# Patient Record
Sex: FEMALE | Race: WHITE
[De-identification: names, ages, dates, MRNs, and addresses within clinical notes are randomized per-mention and may not be internally consistent; named-entity substitution may affect disease eponyms.]

---

## 2017-04-23 ENCOUNTER — HOSPITAL ENCOUNTER (EMERGENCY)
Dept: HOSPITAL 31 - C.ER | Age: 22
Discharge: HOME | End: 2017-04-23
Payer: MEDICAID

## 2017-04-23 VITALS — TEMPERATURE: 98.4 F

## 2017-04-23 VITALS — BODY MASS INDEX: 28.2 KG/M2

## 2017-04-23 VITALS — HEART RATE: 88 BPM | DIASTOLIC BLOOD PRESSURE: 89 MMHG | RESPIRATION RATE: 12 BRPM | SYSTOLIC BLOOD PRESSURE: 137 MMHG

## 2017-04-23 VITALS — OXYGEN SATURATION: 99 %

## 2017-04-23 DIAGNOSIS — R00.2: ICD-10-CM

## 2017-04-23 DIAGNOSIS — M94.0: Primary | ICD-10-CM

## 2017-04-23 LAB
ALBUMIN/GLOB SERPL: 1.3 {RATIO} (ref 1–2.1)
ALP SERPL-CCNC: 65 U/L (ref 38–126)
ALT SERPL-CCNC: 32 U/L (ref 9–52)
AST SERPL-CCNC: 29 U/L (ref 14–36)
BASOPHILS # BLD AUTO: 0 K/UL (ref 0–0.2)
BASOPHILS NFR BLD: 0.7 % (ref 0–2)
BILIRUB SERPL-MCNC: 0.6 MG/DL (ref 0.2–1.3)
BILIRUB UR-MCNC: NEGATIVE MG/DL
BUN SERPL-MCNC: 11 MG/DL (ref 7–17)
CALCIUM SERPL-MCNC: 8.7 MG/DL (ref 8.6–10.4)
CHLORIDE SERPL-SCNC: 102 MMOL/L (ref 98–107)
CO2 SERPL-SCNC: 24 MMOL/L (ref 22–30)
EOSINOPHIL # BLD AUTO: 0.1 K/UL (ref 0–0.7)
EOSINOPHIL NFR BLD: 2.4 % (ref 0–4)
ERYTHROCYTE [DISTWIDTH] IN BLOOD BY AUTOMATED COUNT: 13.9 % (ref 11.5–14.5)
GLOBULIN SER-MCNC: 3.2 GM/DL (ref 2.2–3.9)
GLUCOSE SERPL-MCNC: 91 MG/DL (ref 65–105)
GLUCOSE UR STRIP-MCNC: NORMAL MG/DL
HCT VFR BLD CALC: 41.1 % (ref 34–47)
KETONES UR STRIP-MCNC: NEGATIVE MG/DL
LEUKOCYTE ESTERASE UR-ACNC: (no result) LEU/UL
LYMPHOCYTES # BLD AUTO: 0.9 K/UL (ref 1–4.3)
LYMPHOCYTES NFR BLD AUTO: 16.8 % (ref 20–40)
MCH RBC QN AUTO: 26.9 PG (ref 27–31)
MCHC RBC AUTO-ENTMCNC: 32.5 G/DL (ref 33–37)
MCV RBC AUTO: 82.9 FL (ref 81–99)
MONOCYTES # BLD: 0.3 K/UL (ref 0–0.8)
MONOCYTES NFR BLD: 6.4 % (ref 0–10)
NRBC BLD AUTO-RTO: 0 % (ref 0–2)
PH UR STRIP: 6 [PH] (ref 5–8)
PLATELET # BLD: 295 K/UL (ref 130–400)
PMV BLD AUTO: 8.9 FL (ref 7.2–11.7)
POTASSIUM SERPL-SCNC: 3.8 MMOL/L (ref 3.6–5.2)
PROT SERPL-MCNC: 7.6 G/DL (ref 6.3–8.3)
PROT UR STRIP-MCNC: NEGATIVE MG/DL
RBC # UR STRIP: (no result) /UL
RBC #/AREA URNS HPF: 16 /HPF (ref 0–3)
SODIUM SERPL-SCNC: 142 MMOL/L (ref 132–148)
SP GR UR STRIP: 1.02 (ref 1–1.03)
TSH SERPL-ACNC: 3.33 MIU/L (ref 0.46–4.68)
UROBILINOGEN UR-MCNC: NORMAL MG/DL (ref 0.2–1)
WBC # BLD AUTO: 5.4 K/UL (ref 4.8–10.8)
WBC #/AREA URNS HPF: 1 /HPF (ref 0–5)

## 2017-04-23 PROCEDURE — 81001 URINALYSIS AUTO W/SCOPE: CPT

## 2017-04-23 PROCEDURE — 96374 THER/PROPH/DIAG INJ IV PUSH: CPT

## 2017-04-23 PROCEDURE — 84443 ASSAY THYROID STIM HORMONE: CPT

## 2017-04-23 PROCEDURE — 96375 TX/PRO/DX INJ NEW DRUG ADDON: CPT

## 2017-04-23 PROCEDURE — 80053 COMPREHEN METABOLIC PANEL: CPT

## 2017-04-23 PROCEDURE — 85378 FIBRIN DEGRADE SEMIQUANT: CPT

## 2017-04-23 PROCEDURE — 93005 ELECTROCARDIOGRAM TRACING: CPT

## 2017-04-23 PROCEDURE — 83690 ASSAY OF LIPASE: CPT

## 2017-04-23 PROCEDURE — 85025 COMPLETE CBC W/AUTO DIFF WBC: CPT

## 2017-04-23 PROCEDURE — 99284 EMERGENCY DEPT VISIT MOD MDM: CPT

## 2017-04-23 PROCEDURE — 71010: CPT

## 2017-04-23 PROCEDURE — 84703 CHORIONIC GONADOTROPIN ASSAY: CPT

## 2017-04-23 NOTE — CARD
--------------- APPROVED REPORT --------------





EKG Measurement

Heart [id number]

NH [id number]

[id number]

[id number]

[id number]



<Conclusion>

Sinus tachycardia

Possible Left atrial enlargement

Borderline ECG

## 2017-04-23 NOTE — RAD
PROCEDURE:  CHEST RADIOGRAPH, 1 VIEW



HISTORY:

epigastric abd pain, chest pain



COMPARISON:

3/13/17.



FINDINGS:



LUNGS:

Clear.



PLEURA:

No pneumothorax or pleural fluid seen.



CARDIOVASCULAR:

Normal.



OSSEOUS STRUCTURES:

No significant abnormalities.



VISUALIZED UPPER ABDOMEN:

Normal.



OTHER FINDINGS:

None. 



IMPRESSION:

No active disease.

## 2017-04-23 NOTE — C.PDOC
History Of Present Illness


Patient is a 20 y/o female, whose PMHx includes hypothyroidism, presents to the 

ED for evaluation of intermittent chest pain associated with palpitations for 

the past 2-3 months. Pt notes being evaluated by cardiologist with negative 

echocardiogram and monitoring. Patient reports increased mid sternal chest pain 

and palpitation for the past 2-3 day which prompted her to visit ED today. Pt 

states her pain radiates to left axilla and is worse with movement and deep 

breathing. Pt also reports throat pain and body aches. Of note, patient has 

been seen multiple times in the past for similar symptoms. Otherwise, denies 

any fever, cough, nausea, vomiting, diaphoresis, or any other associated 

symptoms at this time. 





Chief Complaint (Nursing): Chest Pain


History Per: Patient


History/Exam Limitations: no limitations


Onset/Duration Of Symptoms: Days (2-3 months), Worse Since (2-3 days)


Current Symptoms Are (Timing): Still Present


Quality: "Pain"


Associated Symptoms: denies: Nausea, Dyspnea, Diaphoresis, Syncope


Modifying Factors: None


Exacerbating Factors: Movement, Deep Breathing


Alleviating Factors: None


Recent travel outside of the [place]: No


Additional History Per: Patient





Past Medical History


Reviewed: Historical Data, Nursing Documentation, Vital Signs


Vital Signs: 


 Last Vital Signs











Temp  98.4 F   17 09:24


 


Pulse  88   17 11:29


 


Resp  12   17 11:29


 


BP  137/89   17 11:29


 


Pulse Ox  99   17 11:31














- Medical History


PMH: Anxiety (10 year history of anxiety), Asthma, Gastritis, Hypothyroidism


   Denies: HTN, Chronic Kidney Disease


Family History: States: No Known Family Hx





- Social History


Hx Tobacco Use: No


Hx Alcohol Use: No


Hx Substance Use: No





- Immunization History


Hx Tetanus Toxoid Vaccination: No


Hx Influenza Vaccination: No


Hx Pneumococcal Vaccination: No





Review Of Systems


Except As Marked, All Systems Reviewed And Found Negative.


Constitutional: Positive for: Other (body aches).  Negative for: Fever, Chills, 

Sweats


ENT: Positive for: Throat Pain.  Negative for: Nose Congestion


Cardiovascular: Positive for: Chest Pain, Palpitations.  Negative for: Edema, 

Light Headedness


Respiratory: Negative for: Cough, Shortness of Breath


Gastrointestinal: Negative for: Nausea, Vomiting





Physical Exam





- Physical Exam


Appears: Non-toxic, No Acute Distress


Skin: Normal Color, Warm, Dry


Head: Atraumatic, Normacephalic


Eye(s): bilateral: Normal Inspection, EOMI


Ear(s): Bilateral: Normal


Oral Mucosa: Moist


Throat: Normal, No Erythema, No Exudate, No Drooling


Neck: Normal ROM, Supple


Chest: Symmetrical, No Tenderness


Cardiovascular: Other (tachycardic)


Respiratory: Normal Breath Sounds, No Accessory Muscle Use, No Rales, No Rhonchi

, No Wheezing


Gastrointestinal/Abdominal: Soft, No Tenderness


Extremity: Normal ROM


Neurological/Psych: Oriented x3, Normal Speech, Normal Cognition





ED Course And Treatment





- Laboratory Results


Result Diagrams: 


 17 10:03





 17 10:03


ECG: Interpreted By Me, Viewed By Me


ECG Rhythm: Sinus Tachycardia


ECG Interpretation: Normal


Rate From EC (bpm)


O2 Sat by Pulse Oximetry: 99 (on RA)


Pulse Ox Interpretation: Normal





- Radiology


CXR: Interpreted by Me


CXR Interpretation: Yes: No Acute Disease.  No: Infiltrates





Medical Decision Making


Medical Decision Making: 


Impression: 20 y/o female with mid sternal chest pain and palpitations





Differential Diagnosis: PE, costochondritis, hyperthyroidism, pancreatitis, 

gastritis, peptic ulcer disease, GERD, anxiety 





Progress note: Prior records reviewed. Patient has been seen here multiple 

times in the past for similar symptoms with negative work up and was discharged 

home.  Labs, CXR, EKG ordered and reviewed. Patient was treated with IV fluids, 

Pepcid, Toradol, and Valium. 





D dimer is negative and the TSH is normal. The patient reports that she feels 

improvement after the medications. Lungs are CTA, heart is RRR, ambulatory in 

the ED with steady gait. 





Disposition





- Disposition


Referrals: 


Max Hall MD [Staff Provider] - 


Disposition: HOME/ ROUTINE


Disposition Time: 11:30


Condition: IMPROVED


Additional Instructions: 


Follow up with the medical doctor within 1-2 days without fail. Return  if 

worsened. 


Prescriptions: 


Naproxen [Naprosyn] 500 mg PO BID #20 tab


Diazepam [Valium] 2 mg PO TID #21 tab


Instructions:  Palpitations (ED)





- Clinical Impression


Clinical Impression: 


 Palpitation, Costochondritis





- PA / NP / Resident Statement


MD/DO has reviewed & agrees with the documentation as recorded.





- Scribe Statement


The provider has reviewed the documentation as recorded by the Scribe


Kripal De Los Santos





All medical record entries made by the Everett were at my direction and 

personally dictated by me. I have reviewed the chart and agree that the record 

accurately reflects my personal performance of the history, physical exam, 

medical decision making, and the department course for this patient. I have 

also personally directed, reviewed, and agree with the discharge instructions 

and disposition.

## 2017-07-29 ENCOUNTER — HOSPITAL ENCOUNTER (EMERGENCY)
Dept: HOSPITAL 31 - C.ER | Age: 22
Discharge: HOME | End: 2017-07-29
Payer: MEDICAID

## 2017-07-29 VITALS
RESPIRATION RATE: 16 BRPM | TEMPERATURE: 98.9 F | HEART RATE: 87 BPM | DIASTOLIC BLOOD PRESSURE: 88 MMHG | SYSTOLIC BLOOD PRESSURE: 134 MMHG

## 2017-07-29 VITALS — OXYGEN SATURATION: 100 %

## 2017-07-29 VITALS — BODY MASS INDEX: 28.2 KG/M2

## 2017-07-29 DIAGNOSIS — R07.9: Primary | ICD-10-CM

## 2017-07-29 LAB
ALBUMIN SERPL-MCNC: 3.8 G/DL (ref 3.5–5)
ALBUMIN/GLOB SERPL: 1.1 {RATIO} (ref 1–2.1)
ALT SERPL-CCNC: 43 U/L (ref 9–52)
AST SERPL-CCNC: 32 U/L (ref 14–36)
BASOPHILS # BLD AUTO: 0 K/UL (ref 0–0.2)
BASOPHILS NFR BLD: 0.7 % (ref 0–2)
BILIRUB UR-MCNC: NEGATIVE MG/DL
BUN SERPL-MCNC: 9 MG/DL (ref 7–17)
CALCIUM SERPL-MCNC: 8.7 MG/DL (ref 8.6–10.4)
EOSINOPHIL # BLD AUTO: 0.1 K/UL (ref 0–0.7)
EOSINOPHIL NFR BLD: 2.5 % (ref 0–4)
ERYTHROCYTE [DISTWIDTH] IN BLOOD BY AUTOMATED COUNT: 14.3 % (ref 11.5–14.5)
GFR NON-AFRICAN AMERICAN: > 60
GLUCOSE UR STRIP-MCNC: NORMAL MG/DL
HCG,QUALITATIVE URINE: NEGATIVE
HGB BLD-MCNC: 13.7 G/DL (ref 11–16)
LEUKOCYTE ESTERASE UR-ACNC: (no result) LEU/UL
LIPASE: 65 U/L (ref 23–300)
LYMPHOCYTES # BLD AUTO: 1.2 K/UL (ref 1–4.3)
LYMPHOCYTES NFR BLD AUTO: 20.3 % (ref 20–40)
MCH RBC QN AUTO: 26.8 PG (ref 27–31)
MCHC RBC AUTO-ENTMCNC: 33.1 G/DL (ref 33–37)
MCV RBC AUTO: 81 FL (ref 81–99)
MONOCYTES # BLD: 0.4 K/UL (ref 0–0.8)
MONOCYTES NFR BLD: 6.9 % (ref 0–10)
NEUTROPHILS # BLD: 4 K/UL (ref 1.8–7)
NEUTROPHILS NFR BLD AUTO: 69.6 % (ref 50–75)
NRBC BLD AUTO-RTO: 0 % (ref 0–2)
PH UR STRIP: 6 [PH] (ref 5–8)
PLATELET # BLD: 304 K/UL (ref 130–400)
PMV BLD AUTO: 8.9 FL (ref 7.2–11.7)
PROT UR STRIP-MCNC: NEGATIVE MG/DL
RBC # BLD AUTO: 5.1 MIL/UL (ref 3.8–5.2)
RBC # UR STRIP: (no result) /UL
SP GR UR STRIP: 1.02 (ref 1–1.03)
SQUAMOUS EPITHIAL: 2 /HPF (ref 0–5)
URINE NITRATE: NEGATIVE
UROBILINOGEN UR-MCNC: NORMAL MG/DL (ref 0.2–1)
WBC # BLD AUTO: 5.8 K/UL (ref 4.8–10.8)

## 2017-07-29 NOTE — C.PDOC
History Of Present Illness


21-year-old female, PMHx includes Hypothyroidism, presents to the emergency 

department with complaints of chest pain ongoing x3 days, that is worse with 

deep breaths. Patient is currently on menstrual cycle. Denies nausea/vomiting, 

fevers, chills. Patient also reports burn on right wrist 


Time Seen by Provider: 17 07:19


Chief Complaint (Nursing): Chest Pain


History Per: Patient


History/Exam Limitations: no limitations


Onset/Duration Of Symptoms: Days


Current Symptoms Are (Timing): Still Present


Severity: Moderate





Past Medical History


Reviewed: Historical Data, Nursing Documentation, Vital Signs


Vital Signs: 


 Last Vital Signs











Temp  98.9 F   17 09:10


 


Pulse  87   17 09:10


 


Resp  16   17 09:10


 


BP  134/88   17 09:10


 


Pulse Ox  100   17 10:41














- Medical History


PMH: Anxiety (10 year history of anxiety), Asthma, Gastritis, Hypothyroidism


   Denies: HTN, Chronic Kidney Disease


Family History: States: No Known Family Hx





- Social History


Hx Tobacco Use: No


Hx Alcohol Use: No


Hx Substance Use: No





- Immunization History


Hx Tetanus Toxoid Vaccination: No


Hx Influenza Vaccination: No


Hx Pneumococcal Vaccination: No





Review Of Systems


Except As Marked, All Systems Reviewed And Found Negative.


Constitutional: Negative for: Fever, Chills


Cardiovascular: Positive for: Chest Pain


Respiratory: Positive for: Shortness of Breath


Gastrointestinal: Negative for: Vomiting


Skin: Negative for: Rash


Neurological: Negative for: Weakness, Numbness, Headache, Dizziness





Physical Exam





- Physical Exam


Appears: Non-toxic, No Acute Distress


Skin: Warm, Dry, No Rash, Other (3x3 area of erythema and blistering right wrist

)


Head: Atraumatic, Normacephalic


Eye(s): bilateral: Normal Inspection, PERRL


Nose: Normal


Oral Mucosa: Moist


Lips: Normal Appearing


Neck: Normal ROM


Respiratory: No Accessory Muscle Use


Extremity: Normal ROM


Neurological/Psych: Oriented x3, Normal Speech





ED Course And Treatment





- Laboratory Results


Result Diagrams: 


 17 07:43





 17 07:43


Lab Interpretation: Normal


Urine Pregnancy POC: Negative


ECG: Interpreted By Me


ECG Rhythm: Sinus Rhythm


ECG Interpretation: Normal


Rate From EC


O2 Sat by Pulse Oximetry: 100


Pulse Ox Interpretation: Normal





- Radiology


CXR: Interpreted by Me


CXR Interpretation: Yes: No Acute Disease


Progress Note: Treated with silvadene cream to affected wrist.  On re-

evaluation lungs clear


Reassessment Condition: Improved





Disposition


Counseled Patient/Family Regarding: Studies Performed, Diagnosis, Need For 

Followup, Rx Given





- Disposition


Referrals: 


[facility] [Outside]


[facility] [Outside]


Disposition: HOME/ ROUTINE


Disposition Time: 09:15


Condition: GOOD


Additional Instructions: 


Follow up with clinic for further evaluation


Instructions:  Chest Pain (ED)


Forms:  [facility] (English)





- POA


Present On Arrival: None





- Clinical Impression


Clinical Impression: 


 Chest pain








- Scribe Statement


The provider has reviewed the documentation as recorded by the Scribe (Elza Mims)








All medical record entries made by the Scribe were at my direction and 

personally dictated by me. I have reviewed the chart and agree that the record 

accurately reflects my personal performance of the history, physical exam, 

medical decision making, and the department course for this patient. I have 

also personally directed, reviewed, and agree with the discharge instructions 

and disposition.

## 2017-07-29 NOTE — RAD
HISTORY:



COMPARISON:

04/23/2017



TECHNIQUE:

Chest PA and lateral



FINDINGS:



LINES AND TUBES:

None. 



LUNG AND PLEURA:

Lungs are clear. There are no pleural effusions or pneumothorax.



HEART AND MEDIASTINUM:

The heart is not enlarged. The hilar and mediastinal contours are 

within normal limits.



SKELETAL STRUCTURES:

The bony structures are within normal limits for the patient's age.



VISUALIZED UPPER ABDOMEN:

Normal.



OTHER FINDINGS:

None.



IMPRESSION:

No active pulmonary disease.

## 2018-04-17 ENCOUNTER — HOSPITAL ENCOUNTER (EMERGENCY)
Dept: HOSPITAL 31 - C.ER | Age: 23
Discharge: HOME | End: 2018-04-17
Payer: MEDICAID

## 2018-04-17 VITALS — BODY MASS INDEX: 28.2 KG/M2

## 2018-04-17 VITALS — RESPIRATION RATE: 18 BRPM | OXYGEN SATURATION: 100 %

## 2018-04-17 VITALS — SYSTOLIC BLOOD PRESSURE: 137 MMHG | HEART RATE: 107 BPM | DIASTOLIC BLOOD PRESSURE: 86 MMHG

## 2018-04-17 VITALS — TEMPERATURE: 98.2 F

## 2018-04-17 DIAGNOSIS — Z3A.01: ICD-10-CM

## 2018-04-17 DIAGNOSIS — O20.0: Primary | ICD-10-CM

## 2018-04-17 LAB
ALBUMIN SERPL-MCNC: 4.3 G/DL (ref 3.5–5)
ALBUMIN/GLOB SERPL: 1.2 {RATIO} (ref 1–2.1)
ALT SERPL-CCNC: 33 U/L (ref 9–52)
AST SERPL-CCNC: 33 U/L (ref 14–36)
BASOPHILS # BLD AUTO: 0 K/UL (ref 0–0.2)
BASOPHILS NFR BLD: 0.4 % (ref 0–2)
BILIRUB UR-MCNC: NEGATIVE MG/DL
BUN SERPL-MCNC: 14 MG/DL (ref 7–17)
CALCIUM SERPL-MCNC: 8.9 MG/DL (ref 8.6–10.4)
EOSINOPHIL # BLD AUTO: 0.1 K/UL (ref 0–0.7)
EOSINOPHIL NFR BLD: 1.5 % (ref 0–4)
ERYTHROCYTE [DISTWIDTH] IN BLOOD BY AUTOMATED COUNT: 13.6 % (ref 11.5–14.5)
GFR NON-AFRICAN AMERICAN: > 60
GLUCOSE UR STRIP-MCNC: NORMAL MG/DL
HCG,QUALITATIVE URINE: POSITIVE
HGB BLD-MCNC: 13.1 G/DL (ref 11–16)
LEUKOCYTE ESTERASE UR-ACNC: (no result) LEU/UL
LYMPHOCYTES # BLD AUTO: 1.4 K/UL (ref 1–4.3)
LYMPHOCYTES NFR BLD AUTO: 16.7 % (ref 20–40)
MCH RBC QN AUTO: 28.2 PG (ref 27–31)
MCHC RBC AUTO-ENTMCNC: 33.9 G/DL (ref 33–37)
MCV RBC AUTO: 83.2 FL (ref 81–99)
MONOCYTES # BLD: 0.6 K/UL (ref 0–0.8)
MONOCYTES NFR BLD: 6.6 % (ref 0–10)
NEUTROPHILS # BLD: 6.4 K/UL (ref 1.8–7)
NEUTROPHILS NFR BLD AUTO: 74.8 % (ref 50–75)
NRBC BLD AUTO-RTO: 0.1 % (ref 0–2)
PH UR STRIP: 5 [PH] (ref 5–8)
PLATELET # BLD: 331 K/UL (ref 130–400)
PMV BLD AUTO: 8.3 FL (ref 7.2–11.7)
PROT UR STRIP-MCNC: NEGATIVE MG/DL
RBC # BLD AUTO: 4.64 MIL/UL (ref 3.8–5.2)
RBC # UR STRIP: (no result) /UL
SP GR UR STRIP: 1.01 (ref 1–1.03)
SQUAMOUS EPITHIAL: 2 /HPF (ref 0–5)
UROBILINOGEN UR-MCNC: NORMAL MG/DL (ref 0.2–1)
WBC # BLD AUTO: 8.6 K/UL (ref 4.8–10.8)

## 2018-04-17 NOTE — C.PDOC
History Of Present Illness


23 y/o female presents to the ED complaining of left adnexal pain, onset this 

morning. Patient had a positive home pregnancy test. Verified at PMDs office 

today via urine test. Patients PMHx significant for anxiety and HTN, she is 

. Denies any vaginal bleeding. 





Time Seen by Provider: 18 19:50


Chief Complaint (Nursing): Abdominal Pain


History Per: Patient


History/Exam Limitations: no limitations


Onset/Duration Of Symptoms: Days (x1)


Current Symptoms Are (Timing): Still Present


: 1


Para: 0


Miscarriage: 0





Past Medical History


Reviewed: Historical Data, Nursing Documentation, Vital Signs


Vital Signs: 


 Last Vital Signs











Temp  98.4 F   18 19:05


 


Pulse  107 H  18 22:32


 


Resp  18   18 22:32


 


BP  137/86   18 22:32


 


Pulse Ox  100   18 22:32














- Medical History


PMH: Anxiety (10 year history of anxiety), Asthma, Gastritis, HTN, 

Hypothyroidism


   Denies: Chronic Kidney Disease


Family History: States: Unknown Family Hx





- Social History


Hx Tobacco Use: No


Hx Alcohol Use: No


Hx Substance Use: No





- Immunization History


Hx Tetanus Toxoid Vaccination: No


Hx Influenza Vaccination: No


Hx Pneumococcal Vaccination: No





Review Of Systems


Except As Marked, All Systems Reviewed And Found Negative.


Constitutional: Negative for: Fever, Chills


Gastrointestinal: Positive for: Abdominal Pain (left adnexal).  Negative for: 

Nausea, Vomiting


Genitourinary: Negative for: Vaginal Bleeding





Physical Exam





- Physical Exam


Appears: Non-toxic, No Acute Distress


Skin: Normal Color, Warm, Dry


Head: Atraumatic, Normacephalic


Eye(s): bilateral: Normal Inspection, PERRL, EOMI


Nose: Normal


Oral Mucosa: Moist


Neck: Normal ROM, Supple


Chest: Symmetrical


Cardiovascular: Rhythm Regular, No Murmur


Respiratory: Normal Breath Sounds, No Accessory Muscle Use


Gastrointestinal/Abdominal: Bowel Sounds (positive), Soft, No Tenderness, No 

Guarding, No Rebound


Extremity: Bilateral: Atraumatic, Normal ROM


Neurological/Psych: Oriented x3, Normal Speech





ED Course And Treatment





- Laboratory Results


Result Diagrams: 


 18 20:23





 18 20:23


Lab Interpretation: Normal (quant hcg 330, Blood O+)


Urine Pregnancy POC: Positive


O2 Sat by Pulse Oximetry: 100 (RA)


Pulse Ox Interpretation: Normal


Reevaluation Time: 22:37


Reassessment Condition: Improved





- Physician Consult Information


Outcome Of Conversation: 2230: d/w Dr. Thorne, OB On Call- ok to d/c pt and f/

u repeat QHCG in 2 days





Medical Decision Making


Medical Decision Making: 





Time: 19:58





Initial Plan:


* Blood type and screen


* Beta-HCG, quantitative 


* CMP


* CBC


* Urinalysis


* Transvaginal US





probably early IUP or missed AB, LOW susp of ectopic


repeat QHCG 2 days.





Disposition


Doctor Will See Patient In The: Office


Counseled Patient/Family Regarding: Studies Performed, Diagnosis





- Disposition


Disposition: HOME/ ROUTINE


Disposition Time: 22:38


Condition: GOOD


Forms:  [facility] (English)





- Clinical Impression


Clinical Impression: 


 , threatened, early pregnancy








- Scribe Statement


The provider has reviewed the documentation as recorded by the Scribe (Sarah Segovia)


Provider Attestation: 





All medical record entries made by the Scribe were at my direction and 

personally dictated by me. I have reviewed the chart and agree that the record 

accurately reflects my personal performance of the history, physical exam, 

medical decision making, and the department course for this patient. I have 

also personally directed, reviewed, and agree with the discharge instructions 

and disposition.

## 2018-04-18 NOTE — US
HISTORY:

L adnexal pain,  preg test @ home



LMP: 03/15/2018



COMPARISON:

None available.



TECHNIQUE:

Grayscale and color Doppler sonographic images were obtained of the 

pelvis utilizing transabdominal and transvaginal approach.



FINDINGS:



UTERUS:

Anteverted and normal in size measuring 8.2 x 3.5 x 5.6 cm.



ENDOMETRIUM:

Measures 1.7 cm, mildly thickened. 



CERVIX:

No definite cervical abnormality identified.



RIGHT OVARY:

Measures 3 x 1.7 x 2 cm. Follicles noted. There is a 2 cm corpus 

luteum. Normal flow. 



LEFT OVARY:

Measures 2 x 1.7 x 1.7 cm. Follicles noted. Normal flow. 



FREE FLUID:

Trace pelvic free fluid noted, likely physiologic.



OTHER FINDINGS:

None. 



IMPRESSION:

Mildly thickened endometrium. No intrauterine gestational sac 

identified. Findings may be due to pregnancy too early to detect or 

missed . Ectopic pregnancy cannot be excluded in the setting 

of a positive pregnancy test and no intrauterine gestational sac. 

Recommend followup with serial beta HCG levels and pelvic ultrasound. 





Preliminary impression was provided by [facility]. Findings 

are concordant.

## 2018-04-19 ENCOUNTER — HOSPITAL ENCOUNTER (EMERGENCY)
Dept: HOSPITAL 31 - C.ER | Age: 23
Discharge: HOME | End: 2018-04-19
Payer: MEDICAID

## 2018-04-19 VITALS — HEART RATE: 100 BPM | OXYGEN SATURATION: 100 % | DIASTOLIC BLOOD PRESSURE: 95 MMHG | SYSTOLIC BLOOD PRESSURE: 139 MMHG

## 2018-04-19 VITALS — OXYGEN SATURATION: 100 % | RESPIRATION RATE: 18 BRPM | TEMPERATURE: 98.4 F

## 2018-04-19 VITALS — HEART RATE: 97 BPM | DIASTOLIC BLOOD PRESSURE: 95 MMHG | SYSTOLIC BLOOD PRESSURE: 144 MMHG

## 2018-04-19 VITALS — BODY MASS INDEX: 28.2 KG/M2

## 2018-04-19 VITALS — RESPIRATION RATE: 16 BRPM

## 2018-04-19 VITALS — TEMPERATURE: 98.4 F

## 2018-04-19 DIAGNOSIS — I10: ICD-10-CM

## 2018-04-19 DIAGNOSIS — O26.899: Primary | ICD-10-CM

## 2018-04-19 DIAGNOSIS — E03.9: ICD-10-CM

## 2018-04-19 DIAGNOSIS — R10.32: ICD-10-CM

## 2018-04-19 LAB
ALBUMIN SERPL-MCNC: 4 G/DL (ref 3.5–5)
ALBUMIN/GLOB SERPL: 1.1 {RATIO} (ref 1–2.1)
ALT SERPL-CCNC: 33 U/L (ref 9–52)
APTT BLD: 32 SECONDS (ref 21–34)
AST SERPL-CCNC: 25 U/L (ref 14–36)
BASOPHILS # BLD AUTO: 0 K/UL (ref 0–0.2)
BASOPHILS # BLD AUTO: 0 K/UL (ref 0–0.2)
BASOPHILS NFR BLD: 0.5 % (ref 0–2)
BASOPHILS NFR BLD: 0.5 % (ref 0–2)
BILIRUB UR-MCNC: NEGATIVE MG/DL
BUN SERPL-MCNC: 10 MG/DL (ref 7–17)
BUN SERPL-MCNC: 13 MG/DL (ref 7–17)
CALCIUM SERPL-MCNC: 8.8 MG/DL (ref 8.6–10.4)
CALCIUM SERPL-MCNC: 9.1 MG/DL (ref 8.6–10.4)
EOSINOPHIL # BLD AUTO: 0.1 K/UL (ref 0–0.7)
EOSINOPHIL # BLD AUTO: 0.1 K/UL (ref 0–0.7)
EOSINOPHIL NFR BLD: 0.9 % (ref 0–4)
EOSINOPHIL NFR BLD: 1.3 % (ref 0–4)
ERYTHROCYTE [DISTWIDTH] IN BLOOD BY AUTOMATED COUNT: 13.4 % (ref 11.5–14.5)
ERYTHROCYTE [DISTWIDTH] IN BLOOD BY AUTOMATED COUNT: 13.8 % (ref 11.5–14.5)
GFR NON-AFRICAN AMERICAN: > 60
GFR NON-AFRICAN AMERICAN: > 60
GLUCOSE UR STRIP-MCNC: NORMAL MG/DL
HGB BLD-MCNC: 13.2 G/DL (ref 11–16)
HGB BLD-MCNC: 14 G/DL (ref 11–16)
INR PPP: 1.2
LEUKOCYTE ESTERASE UR-ACNC: (no result) LEU/UL
LYMPHOCYTES # BLD AUTO: 1.2 K/UL (ref 1–4.3)
LYMPHOCYTES # BLD AUTO: 1.6 K/UL (ref 1–4.3)
LYMPHOCYTES NFR BLD AUTO: 17.8 % (ref 20–40)
LYMPHOCYTES NFR BLD AUTO: 19.1 % (ref 20–40)
MCH RBC QN AUTO: 27.9 PG (ref 27–31)
MCH RBC QN AUTO: 28.5 PG (ref 27–31)
MCHC RBC AUTO-ENTMCNC: 33.6 G/DL (ref 33–37)
MCHC RBC AUTO-ENTMCNC: 33.9 G/DL (ref 33–37)
MCV RBC AUTO: 83.1 FL (ref 81–99)
MCV RBC AUTO: 83.9 FL (ref 81–99)
MONOCYTES # BLD: 0.5 K/UL (ref 0–0.8)
MONOCYTES # BLD: 0.8 K/UL (ref 0–0.8)
MONOCYTES NFR BLD: 9 % (ref 0–10)
MONOCYTES NFR BLD: 9.1 % (ref 0–10)
NEUTROPHILS # BLD: 4.2 K/UL (ref 1.8–7)
NEUTROPHILS # BLD: 6.2 K/UL (ref 1.8–7)
NEUTROPHILS NFR BLD AUTO: 70.1 % (ref 50–75)
NEUTROPHILS NFR BLD AUTO: 71.7 % (ref 50–75)
NRBC BLD AUTO-RTO: 0 % (ref 0–2)
NRBC BLD AUTO-RTO: 0.1 % (ref 0–2)
PH UR STRIP: 7 [PH] (ref 5–8)
PLATELET # BLD: 297 K/UL (ref 130–400)
PLATELET # BLD: 376 K/UL (ref 130–400)
PMV BLD AUTO: 8.2 FL (ref 7.2–11.7)
PMV BLD AUTO: 8.6 FL (ref 7.2–11.7)
PROT UR STRIP-MCNC: NEGATIVE MG/DL
PROTHROMBIN TIME: 13 SECONDS (ref 9.7–12.2)
RBC # BLD AUTO: 4.63 MIL/UL (ref 3.8–5.2)
RBC # BLD AUTO: 5.01 MIL/UL (ref 3.8–5.2)
RBC # UR STRIP: NEGATIVE /UL
SP GR UR STRIP: 1.02 (ref 1–1.03)
SQUAMOUS EPITHIAL: 1 /HPF (ref 0–5)
UROBILINOGEN UR-MCNC: 2 MG/DL (ref 0.2–1)
WBC # BLD AUTO: 6 K/UL (ref 4.8–10.8)
WBC # BLD AUTO: 8.7 K/UL (ref 4.8–10.8)

## 2018-04-19 PROCEDURE — 85025 COMPLETE CBC W/AUTO DIFF WBC: CPT

## 2018-04-19 PROCEDURE — 86900 BLOOD TYPING SEROLOGIC ABO: CPT

## 2018-04-19 PROCEDURE — 86850 RBC ANTIBODY SCREEN: CPT

## 2018-04-19 PROCEDURE — 96360 HYDRATION IV INFUSION INIT: CPT

## 2018-04-19 PROCEDURE — 80053 COMPREHEN METABOLIC PANEL: CPT

## 2018-04-19 PROCEDURE — 99284 EMERGENCY DEPT VISIT MOD MDM: CPT

## 2018-04-19 PROCEDURE — 85730 THROMBOPLASTIN TIME PARTIAL: CPT

## 2018-04-19 PROCEDURE — 85610 PROTHROMBIN TIME: CPT

## 2018-04-19 PROCEDURE — 96374 THER/PROPH/DIAG INJ IV PUSH: CPT

## 2018-04-19 PROCEDURE — 84702 CHORIONIC GONADOTROPIN TEST: CPT

## 2018-04-19 PROCEDURE — 81001 URINALYSIS AUTO W/SCOPE: CPT

## 2018-04-19 PROCEDURE — 76817 TRANSVAGINAL US OBSTETRIC: CPT

## 2018-04-19 PROCEDURE — 96361 HYDRATE IV INFUSION ADD-ON: CPT

## 2018-04-19 PROCEDURE — 80048 BASIC METABOLIC PNL TOTAL CA: CPT

## 2018-04-19 NOTE — US
HISTORY:

LLQ/LEFT ADNEXAL TTP.  LMP 2018 



COMPARISON:

Pelvic ultrasound 2018



TECHNIQUE:

Grayscale and color Doppler sonographic images were obtained of the 

pelvis utilizing  transvaginal approach.



FINDINGS:



UTERUS:

Anteverted and normal in size measuring 7.7 x 3.9 x 4.8 cm.



ENDOMETRIUM:

Cystic structure noted in the endometrial canal measuring 0.2 cm. 



CERVIX:

Closed and measures 3.6 cm.



RIGHT OVARY:

Measures 3.7 x 2.1 x 3 cm. Multiple follicles noted. There is a 1.6 

cm corpus luteum. Normal flow. 



LEFT OVARY:

Measures 3.2 x 1 x 1.8 cm. Follicle noted. Normal flow. 



FREE FLUID:

Trace amount pelvic free fluid noted, likely physiologic.



OTHER FINDINGS:

None. 



IMPRESSION:

Cystic structure measuring 0.2 cm in the endometrial canal. No fetal 

pole identified. Findings may be due to pregnancy too early to detect 

or missed . Ectopic pregnancy cannot be excluded in the 

setting of a positive pregnancy test and no intrauterine gestational 

sac. Recommend followup with serial beta HCG levels and pelvic 

ultrasound.

## 2018-04-19 NOTE — C.PDOC
History Of Present Illness


Pt presents again for llq pain with some back radiation. Pt is pregnant with 

yesterday's hcg of 850. had 2 pelvis US witch did not yet showed an IUP.


Time Seen by Provider: 04/19/18 19:38


Chief Complaint (Nursing): Abdominal Pain


History Per: Patient


History/Exam Limitations: no limitations


Onset/Duration Of Symptoms: Days


Current Symptoms Are (Timing): Still Present


Context: Other


Severity: Moderate


Pain Scale Rating Of: 5


Location Of Pain/Discomfort: LLQ


Radiation Of Pain To:: Back


Quality Of Discomfort: Sharp, Cramping


Associated Symptoms: Nausea.  denies: Fever, Chills, Vomiting


Exacerbating Factors: None


Alleviating Factors: None


Last Bowel Movement: Today


Recent travel outside of the [place]: No


Additional History Per: Patient


Abnormal Vaginal Bleeding: No





Past Medical History


Reviewed: Historical Data, Nursing Documentation, Vital Signs


Vital Signs: 


 Last Vital Signs











Temp  98.4 F   04/19/18 19:15


 


Pulse  90   04/19/18 19:58


 


Resp  16   04/19/18 19:58


 


BP  146/97 H  04/19/18 19:58


 


Pulse Ox  99   04/19/18 20:11














- Medical History


PMH: Anxiety (10 year history of anxiety), Asthma, Gastritis, HTN, 

Hypothyroidism


   Denies: Chronic Kidney Disease


Family History: States: No Known Family Hx





- Social History


Hx Tobacco Use: No


Hx Alcohol Use: No


Hx Substance Use: No





- Immunization History


Hx Tetanus Toxoid Vaccination: No


Hx Influenza Vaccination: No


Hx Pneumococcal Vaccination: No





Review Of Systems


Constitutional: Negative for: Fever, Chills


Cardiovascular: Negative for: Chest Pain


Respiratory: Negative for: Shortness of Breath


Gastrointestinal: Positive for: Nausea, Abdominal Pain (llq).  Negative for: 

Vomiting


Genitourinary: Negative for: Dysuria


Musculoskeletal: Positive for: Back Pain


Skin: Negative for: Rash


Neurological: Negative for: Weakness


Psych: Negative for: Anxiety





Physical Exam





- Physical Exam


Appears: Non-toxic


Skin: Warm, Dry


Head: Normacephalic


Oral Mucosa: Moist


Neck: Supple


Chest: Symmetrical


Cardiovascular: Rhythm Regular


Respiratory: No Rales, No Rhonchi, No Wheezing


Gastrointestinal/Abdominal: Soft, Tenderness (llw), No Distention


Back: No CVA Tenderness


Extremity: Normal ROM


Extremity: Bilateral: Atraumatic


Neurological/Psych: Oriented x3, Normal Speech, Normal Cognition


Gait: Steady





ED Course And Treatment





- Laboratory Results


Result Diagrams: 


 04/19/18 20:36





 04/19/18 20:36


O2 Sat by Pulse Oximetry: 99


Pulse Ox Interpretation: Normal


Progress Note: Spoke with Dr Thorne(OB/GYN) . ok with repeat US and BHCG in 48 

hours. Ok to dc home





Disposition


Counseled Patient/Family Regarding: Studies Performed, Diagnosis, Need For 

Followup, Rx Given





- Disposition


Referrals: 


Max Hall MD [Primary Care Provider] - 


Disposition: HOME/ ROUTINE


Disposition Time: 19:38


Condition: FAIR


Additional Instructions: 


Please return in 48 hours( Saturday 4/21/18) for repeat bhcg and pelvic 

ultrasound


Prescriptions: 


oxyCODONE/Acetaminophen [Percocet 5/325 mg Tab] 1 tab PO TID PRN #15 tab


 PRN Reason: Pain, Severe (8-10)


Instructions:  Pregnancy - The First Month


Forms:  [facility] (English)





- Clinical Impression


Clinical Impression: 


 Abdominal pain affecting pregnancy, Pregnancy

## 2018-04-19 NOTE — C.PDOC
History Of Present Illness


23 y/o female presents to ED with complaints of LLQ for 2 days and for repeat 

US. Patient was seen at ED 4/17/18 had positive pregnancy test, blood work and 

US but was told to come back in 2 days for repeat US to rule out Ectopic 

pregnancy. Patient denies vaginal bleeding, fever, diarrhea or any other 

complaints at this time. 


Time Seen by Provider: 04/19/18 07:53


Chief Complaint (Nursing): Abdominal Pain


History Per: Patient


History/Exam Limitations: no limitations


Onset/Duration Of Symptoms: Days


Current Symptoms Are (Timing): Still Present


Location Of Pain/Discomfort: LLQ





Past Medical History


Reviewed: Historical Data, Nursing Documentation, Vital Signs


Vital Signs: 


 Last Vital Signs











Temp  98.4 F   04/19/18 07:53


 


Pulse  97 H  04/19/18 12:18


 


Resp  18   04/19/18 12:18


 


BP  144/95 H  04/19/18 12:18


 


Pulse Ox  100   04/19/18 12:18














- Medical History


PMH: Anxiety (10 year history of anxiety), Asthma, Gastritis, HTN, 

Hypothyroidism


Surgical History: No Surg Hx


Family History: States: No Known Family Hx





- Social History


Hx Tobacco Use: No


Hx Alcohol Use: No


Hx Substance Use: No





- Immunization History


Hx Tetanus Toxoid Vaccination: No


Hx Influenza Vaccination: No


Hx Pneumococcal Vaccination: No





Review Of Systems


Except As Marked, All Systems Reviewed And Found Negative.


Gastrointestinal: Positive for: Abdominal Pain.  Negative for: Nausea, Vomiting

, Diarrhea


Genitourinary: Negative for: Vaginal Bleeding





Physical Exam





- Physical Exam


Appears: Non-toxic, No Acute Distress


Skin: Warm, Dry, No Rash


Head: Atraumatic, Normacephalic


Oral Mucosa: Moist


Neck: Normal ROM, Supple


Cardiovascular: Rhythm Regular


Respiratory: Normal Breath Sounds, No Rales, No Rhonchi, No Wheezing


Gastrointestinal/Abdominal: Soft, Tenderness (LLQ and Left adnexal area), No 

Guarding, No Rebound


Back: No CVA Tenderness


Extremity: Normal ROM, Capillary Refill (<2 seconds)


Neurological/Psych: Oriented x3, Normal Speech, Normal Cognition





ED Course And Treatment





- Laboratory Results


Result Diagrams: 


 04/19/18 08:31





 04/19/18 08:06


O2 Sat by Pulse Oximetry: 100 (RA)


Pulse Ox Interpretation: Normal


Progress Note: Blood work and OB transvaginal US ordered. IV fluids 

administered.





Disposition


Counseled Patient/Family Regarding: Studies Performed, Diagnosis, Need For 

Followup, Rx Given





- Disposition


Referrals: 


Max Hall MD [Staff Provider] - 


Disposition: HOME/ ROUTINE


Disposition Time: 12:15


Condition: STABLE


Additional Instructions: 


FOLLOW UP WITH OB/GYN WITHIN 1 WEEK





USE TYLENOL AS NEEDED FOR PAIN





RETURN TO ER IN TWO DAYS FOR REPEAT HORMONE LEVEL AND ULTRASOUND





RETURN SOONER IF YOUR SYMPTOMS WORSEN 


Forms:  [facility] (English)


Print Language: ENGLISH





- POA


Present On Arrival: None





- Clinical Impression


Clinical Impression: 


 Pelvic pain affecting pregnancy








- Scribe Statement


The provider has reviewed the documentation as recorded by the Scribe


Merrill Meng





All medical record entries made by the Mahiibisma were at my direction and 

personally dictated by me. I have reviewed the chart and agree that the record 

accurately reflects my personal performance of the history, physical exam, 

medical decision making, and the department course for this patient. I have 

also personally directed, reviewed, and agree with the discharge instructions 

and disposition.

## 2018-04-21 ENCOUNTER — HOSPITAL ENCOUNTER (EMERGENCY)
Dept: HOSPITAL 31 - C.ER | Age: 23
Discharge: HOME | End: 2018-04-21
Payer: MEDICAID

## 2018-04-21 VITALS — BODY MASS INDEX: 28.2 KG/M2

## 2018-04-21 VITALS — DIASTOLIC BLOOD PRESSURE: 87 MMHG | TEMPERATURE: 98.7 F | SYSTOLIC BLOOD PRESSURE: 136 MMHG | HEART RATE: 83 BPM

## 2018-04-21 VITALS — RESPIRATION RATE: 18 BRPM

## 2018-04-21 VITALS — OXYGEN SATURATION: 100 %

## 2018-04-21 DIAGNOSIS — O26.891: Primary | ICD-10-CM

## 2018-04-21 DIAGNOSIS — I10: ICD-10-CM

## 2018-04-21 DIAGNOSIS — R10.9: ICD-10-CM

## 2018-04-21 DIAGNOSIS — E03.9: ICD-10-CM

## 2018-04-21 LAB
ALBUMIN SERPL-MCNC: 4.1 G/DL (ref 3.5–5)
ALBUMIN/GLOB SERPL: 1.2 {RATIO} (ref 1–2.1)
ALT SERPL-CCNC: 29 U/L (ref 9–52)
AST SERPL-CCNC: 24 U/L (ref 14–36)
BASOPHILS # BLD AUTO: 0 K/UL (ref 0–0.2)
BASOPHILS NFR BLD: 0.6 % (ref 0–2)
BILIRUB UR-MCNC: NEGATIVE MG/DL
BUN SERPL-MCNC: 11 MG/DL (ref 7–17)
CALCIUM SERPL-MCNC: 8.9 MG/DL (ref 8.6–10.4)
EOSINOPHIL # BLD AUTO: 0.1 K/UL (ref 0–0.7)
EOSINOPHIL NFR BLD: 0.8 % (ref 0–4)
ERYTHROCYTE [DISTWIDTH] IN BLOOD BY AUTOMATED COUNT: 13.5 % (ref 11.5–14.5)
GFR NON-AFRICAN AMERICAN: > 60
GLUCOSE UR STRIP-MCNC: NORMAL MG/DL
HGB BLD-MCNC: 13.5 G/DL (ref 11–16)
LEUKOCYTE ESTERASE UR-ACNC: (no result) LEU/UL
LYMPHOCYTES # BLD AUTO: 1.3 K/UL (ref 1–4.3)
LYMPHOCYTES NFR BLD AUTO: 19 % (ref 20–40)
MCH RBC QN AUTO: 28.7 PG (ref 27–31)
MCHC RBC AUTO-ENTMCNC: 34.1 G/DL (ref 33–37)
MCV RBC AUTO: 84.1 FL (ref 81–99)
MONOCYTES # BLD: 0.5 K/UL (ref 0–0.8)
MONOCYTES NFR BLD: 7.7 % (ref 0–10)
NEUTROPHILS # BLD: 4.9 K/UL (ref 1.8–7)
NEUTROPHILS NFR BLD AUTO: 71.9 % (ref 50–75)
NRBC BLD AUTO-RTO: 0.1 % (ref 0–2)
PH UR STRIP: 5 [PH] (ref 5–8)
PLATELET # BLD: 322 K/UL (ref 130–400)
PMV BLD AUTO: 8.8 FL (ref 7.2–11.7)
PROT UR STRIP-MCNC: NEGATIVE MG/DL
RBC # BLD AUTO: 4.72 MIL/UL (ref 3.8–5.2)
RBC # UR STRIP: NEGATIVE /UL
SP GR UR STRIP: 1.03 (ref 1–1.03)
SQUAMOUS EPITHIAL: 6 /HPF (ref 0–5)
UROBILINOGEN UR-MCNC: 2 MG/DL (ref 0.2–1)
WBC # BLD AUTO: 6.8 K/UL (ref 4.8–10.8)

## 2018-04-21 NOTE — C.PDOC
History Of Present Illness


22-year-old female (), presents to the emergency department with complaints 

of left-sided abdominal pain that started one week ago. Patient notes that pain 

has progressively worsened and radiates to back. Patient was seen in ED on  

and , had an ultrasound that showed no IUP, and patient was instructed to 

return for re-evaluation and repeat hcg count. Patient denies any nausea/

vomiting or vaginal bleeding. Admits to constipation.  


Time Seen by Provider: 18 10:03


Chief Complaint (Nursing): Abdominal Pain


History Per: Patient


History/Exam Limitations: no limitations


Onset/Duration Of Symptoms: Days


Current Symptoms Are (Timing): Still Present


Severity: Moderate





Past Medical History


Reviewed: Historical Data, Nursing Documentation, Vital Signs


Vital Signs: 


 Last Vital Signs











Temp  98.7 F   18 14:12


 


Pulse  83   18 14:12


 


Resp  18   18 14:12


 


BP  136/87   18 14:12


 


Pulse Ox  98   18 14:12














- Medical History


PMH: Anxiety (10 year history of anxiety), Asthma, Gastritis, HTN, 

Hypothyroidism


Family History: States: No Known Family Hx





- Social History


Hx Tobacco Use: No


Hx Alcohol Use: No


Hx Substance Use: No





- Immunization History


Hx Tetanus Toxoid Vaccination: No


Hx Influenza Vaccination: No


Hx Pneumococcal Vaccination: No





Review Of Systems


Except As Marked, All Systems Reviewed And Found Negative.


Constitutional: Negative for: Fever, Chills


Gastrointestinal: Positive for: Abdominal Pain.  Negative for: Nausea, Vomiting


Genitourinary: Positive for: Pelvic Pain


Musculoskeletal: Negative for: Back Pain


Neurological: Negative for: Weakness, Numbness, Headache, Dizziness





Physical Exam





- Physical Exam


Appears: Well, Non-toxic, No Acute Distress


Skin: Normal Color, Warm, Dry, No Rash


Head: Atraumatic, Normacephalic


Eye(s): bilateral: Normal Inspection, PERRL, EOMI


Nose: Normal


Oral Mucosa: Moist


Lips: Normal Appearing


Neck: Normal ROM, Supple


Chest: Symmetrical


Cardiovascular: Rhythm Regular


Respiratory: Normal Breath Sounds, No Accessory Muscle Use


Gastrointestinal/Abdominal: Soft, Tenderness (LLQ, left pelvic), No Guarding, 

No Rebound


Back: No CVA Tenderness, No Vertebral Tenderness


Extremity: Normal ROM, No Deformity, No Swelling


Neurological/Psych: Oriented x3, Normal Speech





ED Course And Treatment





- Laboratory Results


Result Diagrams: 


 18 10:26





 18 10:26


O2 Sat by Pulse Oximetry: 100 (RA)


Pulse Ox Interpretation: Normal





- CT Scan/US


  ** US Transvaginal


Other Rad Studies (CT/US): Read By Radiologist, Radiology Report Reviewed


CT/US Interpretation: Accession No. : [id number].  Patient Name / ID : 

SIN VILLARREAL  / [id number].  Exam Date : 2018 11:48:09 ( Approved ).  

Study Comment :  Sex / Age : F  / 22Y.  Creator : Charles Martinez MD.  

Dictator : Charles Martinez MD.   :  Approver : Charles Martinez MD.  Approver2 :  Report Date : 2018 13:45:20.  My Comment :  ****

*******************************************************************************

.  HISTORY:  pain ; last menstrual period is 2018 suggesting estimated 

gestational age of 5 weeks 2 days.  COMPARISON:  None available.  TECHNIQUE:  

Transvaginal pelvic ultrasound was performed with longitudinal and transverse 

images submitted for interpretation.  FINDINGS:  UTERUS:  Ace gestational sac 

is identified within the endometrial cavity with normal appearing decidual 

reaction. A tiny yolk sac is suggested within the endometrial cavity though it 

is too small to confirm. No definitive fetal pole is identified at this time. 

The mean sac diameter is 0.51 cm suggesting less than 6 weeks estimated 

gestational age which is concordant with LMP derived dates. There is no 

definite pattern to suggest an ectopic gestation at either adnexal compartment. 

Consider early intrauterine gestation, possibly viable but difficult to prove 

given lack of middle pole. Failure pregnancy is not excluded nevertheless. 

Ectopic gestation is also not completely excluded.  Measures 8.0 x 3.9 x 5.6 

cm. Normal in size with unremarkable appearing myometrium. No fibroid or other 

mass lesion seen.  ENDOMETRIUM:  Intrauterine gestation identified as discussed 

above.  CERVIX:  3.2 cm in length, nonfocal in appearance.  RIGHT OVARY:  

Measures 3.1 x 2.1 x 1.9 cm. A likely corpus luteum cyst measures 1.5 cm 

greatest dimension with a small follicle or other simple cysts also identified 

in the right ovary. Normal flow.  LEFT OVARY:  Measures 2.5 x 1.9 x 2.2 cm. No 

solid mass. Normal flow.  FREE FLUID:  Nonspecific limited right adnexal fluid 

is appreciated with none otherwise clearly demonstrated.  OTHER FINDINGS:  

None.  IMPRESSION:  There is a likely early intrauterine gestation identified 

within the endometrial cavity with questionable yolk sac but no fetal pole 

identified at this time. Mean sac diameter suggest average ultrasonic age of 

less than 6 weeks which is concordant with LMP derived dates of 5 weeks 2 days. 

No suspicious decidual echogenicity appreciable. Consider potential early 

viable intrauterine gestation, however, fetal demise is not excluded. Although 

an ectopic gestation is not clearly identified in either adnexal compartment, 

an ectopic gestation is also not excluded completely. Clinical correlation and 

follow-up ultrasonography is advised in 1 week.


Progress Note: Prior Visits:  Notes and records from previous visits were 

reviewed. Patient seen in ED on  and .  Bloodwork, Type and screen, 

Ultrasound and UA ordered and reviewed.  On re-evaluation, pt is tolerating PO. 

Afebrile. Discussed with pt results of US and given copies of work up . 

Instructed to follow up with OBGYN in 1-2 days. Also instructed diet changes 

including increase fiber and water to improve constipation.  Case discussed 

with Dr Sims, agreed upon plan and discharge.





Disposition





- Disposition


Disposition: HOME/ ROUTINE


Disposition Time: 13:55


Condition: STABLE


Additional Instructions: 


COntinue taking your prenatal vitamins and tylenol for any pain. Follow up with

  your OB in 2-3 days. Return to ER if symptoms persist or worsen. 


Instructions:  Pregnancy - The First Month


Forms:  [employer] (English)





- Clinical Impression


Clinical Impression: 


 Pregnancy, Abdominal pain








- Scribe Statement


The provider has reviewed the documentation as recorded by the Scribe (Elza Mims)








All medical record entries made by the Scribe were at my direction and 

personally dictated by me. I have reviewed the chart and agree that the record 

accurately reflects my personal performance of the history, physical exam, 

medical decision making, and the department course for this patient. I have 

also personally directed, reviewed, and agree with the discharge instructions 

and disposition.

## 2018-04-21 NOTE — US
HISTORY:

pain ; last menstrual period is 03/05/2018 suggesting estimated 

gestational age of 5 weeks 2 days. 



COMPARISON:

None available.



TECHNIQUE:

Transvaginal pelvic ultrasound was performed with longitudinal and 

transverse images submitted for interpretation.



FINDINGS:



UTERUS:

Ace gestational sac is identified within the endometrial cavity with 

normal appearing decidual reaction. A tiny yolk sac is suggested 

within the endometrial cavity though it is too small to confirm. No 

definitive fetal pole is identified at this time. The mean sac 

diameter is 0.51 cm suggesting less than 6 weeks estimated 

gestational age which is concordant with LMP derived dates. There is 

no definite pattern to suggest an ectopic gestation at either adnexal 

compartment. Consider early intrauterine gestation, possibly viable 

but difficult to prove given lack of middle pole. Failure pregnancy 

is not excluded nevertheless. Ectopic gestation is also not 

completely excluded. 



Measures 8.0 x 3.9 x 5.6 cm. Normal in size with unremarkable 

appearing myometrium. No fibroid or other mass lesion seen.



ENDOMETRIUM:

Intrauterine gestation identified as discussed above. 



CERVIX:

3.2 cm in length, nonfocal in appearance.



RIGHT OVARY:

Measures 3.1 x 2.1 x 1.9 cm. A likely corpus luteum cyst measures 1.5 

cm greatest dimension with a small follicle or other simple cysts 

also identified in the right ovary. Normal flow. 



LEFT OVARY:

Measures 2.5 x 1.9 x 2.2 cm. No solid mass. Normal flow. 



FREE FLUID:

Nonspecific limited right adnexal fluid is appreciated with none 

otherwise clearly demonstrated.



OTHER FINDINGS:

None. 



IMPRESSION:

There is a likely early intrauterine gestation identified within the 

endometrial cavity with questionable yolk sac but no fetal pole 

identified at this time. Mean sac diameter suggest average ultrasonic 

age of less than 6 weeks which is concordant with LMP derived dates 

of 5 weeks 2 days. No suspicious decidual echogenicity appreciable. 

Consider potential early viable intrauterine gestation, however, 

fetal demise is not excluded. Although an ectopic gestation is not 

clearly identified in either adnexal compartment, an ectopic 

gestation is also not excluded completely. Clinical correlation and 

follow-up ultrasonography is advised in 1 week.

## 2018-05-29 ENCOUNTER — HOSPITAL ENCOUNTER (EMERGENCY)
Dept: HOSPITAL 31 - C.ER | Age: 23
Discharge: HOME | End: 2018-05-29
Payer: MEDICAID

## 2018-05-29 VITALS — DIASTOLIC BLOOD PRESSURE: 90 MMHG | SYSTOLIC BLOOD PRESSURE: 133 MMHG

## 2018-05-29 VITALS — BODY MASS INDEX: 28.2 KG/M2

## 2018-05-29 VITALS — RESPIRATION RATE: 18 BRPM | OXYGEN SATURATION: 96 % | HEART RATE: 82 BPM

## 2018-05-29 VITALS — TEMPERATURE: 98.5 F

## 2018-05-29 DIAGNOSIS — O26.891: Primary | ICD-10-CM

## 2018-05-29 DIAGNOSIS — Z3A.10: ICD-10-CM

## 2018-05-29 DIAGNOSIS — N88.8: ICD-10-CM

## 2018-05-29 DIAGNOSIS — K60.2: ICD-10-CM

## 2018-05-29 LAB
ALBUMIN SERPL-MCNC: 4 G/DL (ref 3.5–5)
ALBUMIN/GLOB SERPL: 1.3 {RATIO} (ref 1–2.1)
ALT SERPL-CCNC: 27 U/L (ref 9–52)
APTT BLD: 32 SECONDS (ref 21–34)
AST SERPL-CCNC: 26 U/L (ref 14–36)
BACTERIA #/AREA URNS HPF: (no result) /[HPF]
BASOPHILS # BLD AUTO: 0 K/UL (ref 0–0.2)
BASOPHILS NFR BLD: 0.5 % (ref 0–2)
BILIRUB UR-MCNC: NEGATIVE MG/DL
BUN SERPL-MCNC: 11 MG/DL (ref 7–17)
CALCIUM SERPL-MCNC: 9.3 MG/DL (ref 8.6–10.4)
EOSINOPHIL # BLD AUTO: 0.2 K/UL (ref 0–0.7)
EOSINOPHIL NFR BLD: 2 % (ref 0–4)
ERYTHROCYTE [DISTWIDTH] IN BLOOD BY AUTOMATED COUNT: 14.3 % (ref 11.5–14.5)
GFR NON-AFRICAN AMERICAN: > 60
GLUCOSE UR STRIP-MCNC: NORMAL MG/DL
HCG,QUALITATIVE URINE: POSITIVE
HGB BLD-MCNC: 12.5 G/DL (ref 11–16)
INR PPP: 1.1
LEUKOCYTE ESTERASE UR-ACNC: (no result) LEU/UL
LIPASE: 158 U/L (ref 23–300)
LYMPHOCYTES # BLD AUTO: 1.7 K/UL (ref 1–4.3)
LYMPHOCYTES NFR BLD AUTO: 17.8 % (ref 20–40)
MCH RBC QN AUTO: 28.4 PG (ref 27–31)
MCHC RBC AUTO-ENTMCNC: 33.8 G/DL (ref 33–37)
MCV RBC AUTO: 84 FL (ref 81–99)
MONOCYTES # BLD: 0.6 K/UL (ref 0–0.8)
MONOCYTES NFR BLD: 6.9 % (ref 0–10)
NEUTROPHILS # BLD: 6.8 K/UL (ref 1.8–7)
NEUTROPHILS NFR BLD AUTO: 72.8 % (ref 50–75)
NRBC BLD AUTO-RTO: 0.1 % (ref 0–2)
PH UR STRIP: 6 [PH] (ref 5–8)
PLATELET # BLD: 325 K/UL (ref 130–400)
PMV BLD AUTO: 9.1 FL (ref 7.2–11.7)
PROT UR STRIP-MCNC: NEGATIVE MG/DL
PROTHROMBIN TIME: 12.1 SECONDS (ref 9.7–12.2)
RBC # BLD AUTO: 4.41 MIL/UL (ref 3.8–5.2)
RBC # UR STRIP: NEGATIVE /UL
SP GR UR STRIP: 1.01 (ref 1–1.03)
SQUAMOUS EPITHIAL: 1 /HPF (ref 0–5)
UROBILINOGEN UR-MCNC: NORMAL MG/DL (ref 0.2–1)
WBC # BLD AUTO: 9.4 K/UL (ref 4.8–10.8)

## 2018-05-29 PROCEDURE — 85025 COMPLETE CBC W/AUTO DIFF WBC: CPT

## 2018-05-29 PROCEDURE — 99285 EMERGENCY DEPT VISIT HI MDM: CPT

## 2018-05-29 PROCEDURE — 81001 URINALYSIS AUTO W/SCOPE: CPT

## 2018-05-29 PROCEDURE — 76801 OB US < 14 WKS SINGLE FETUS: CPT

## 2018-05-29 PROCEDURE — 84702 CHORIONIC GONADOTROPIN TEST: CPT

## 2018-05-29 PROCEDURE — 85610 PROTHROMBIN TIME: CPT

## 2018-05-29 PROCEDURE — 96360 HYDRATION IV INFUSION INIT: CPT

## 2018-05-29 PROCEDURE — 80053 COMPREHEN METABOLIC PANEL: CPT

## 2018-05-29 PROCEDURE — 84703 CHORIONIC GONADOTROPIN ASSAY: CPT

## 2018-05-29 PROCEDURE — 86850 RBC ANTIBODY SCREEN: CPT

## 2018-05-29 PROCEDURE — 86900 BLOOD TYPING SEROLOGIC ABO: CPT

## 2018-05-29 PROCEDURE — 85730 THROMBOPLASTIN TIME PARTIAL: CPT

## 2018-05-29 PROCEDURE — 83690 ASSAY OF LIPASE: CPT

## 2018-05-29 NOTE — C.PDOC
History Of Present Illness


Patient who is 10 weeks pregnant, P:0, presents to the ER with a complaint 

of questionable rectal bleeding vs vaginal bleeding. Patient states she went to 

the bathroom today and noticed blood in the toilet, when she went to wipe she 

states she noticed blood in the rectal region but is unsure where is might have 

originated from. Denies fever, chills, nausea, or vomiting.


Time Seen by Provider: 18 19:06


Chief Complaint (Nursing): GI Problem


History Per: Patient


History/Exam Limitations: no limitations


Onset/Duration Of Symptoms: Hrs


Current Symptoms Are (Timing): Still Present


Severity: Moderate


Pain Scale Rating Of: 4


Associated Symptoms: Other (Rectal bleeding vs vaginal bleeding).  denies: Fever

, Chills, Nausea, Vomiting


Alleviating Factors: None


Recent travel outside of the [place]: No


: 1


Para: 0





Past Medical History


Reviewed: Historical Data, Nursing Documentation, Vital Signs


Vital Signs: 


 Last Vital Signs











Temp  98.5 F   18 19:36


 


Pulse  83   18 21:43


 


Resp  20   18 21:43


 


BP  133/90   18 21:43


 


Pulse Ox  99   18 21:43














- Medical History


PMH: Anxiety (10 year history of anxiety), Asthma, Gastritis, HTN, 

Hypothyroidism


Family History: States: No Known Family Hx





- Social History


Hx Tobacco Use: No


Hx Alcohol Use: No


Hx Substance Use: No





- Immunization History


Hx Tetanus Toxoid Vaccination: No


Hx Influenza Vaccination: No


Hx Pneumococcal Vaccination: No





Review Of Systems


Constitutional: Negative for: Fever, Chills


Respiratory: Negative for: Cough


Gastrointestinal: Positive for: Other (Questionable rectal bleeding).  Negative 

for: Nausea, Vomiting


Genitourinary: Positive for: Vaginal Bleeding (Questionable).  Negative for: 

Dysuria


Skin: Negative for: Rash


Neurological: Negative for: Weakness


Psych: Positive for: Anxiety





Physical Exam





- Physical Exam


Appears: Non-toxic


Skin: Warm, Dry


Head: Normacephalic


Oral Mucosa: Moist


Chest: Symmetrical, No Tenderness


Cardiovascular: Rhythm Regular


Respiratory: No Rales, No Rhonchi, No Wheezing


Gastrointestinal/Abdominal: Soft, No Tenderness, No Distention, No Guarding


Rectal: No Hemorrhoids, Other (0.5 cm supeerficial recatal fissure)


Back: Normal Inspection


Pelvic: No Vaginal Bleeding, No Vaginal Discharge


Extremity: Normal ROM


Extremity: Bilateral: Atraumatic


Neurological/Psych: Oriented x3


Gait: Steady





ED Course And Treatment





- Laboratory Results


Result Diagrams: 


 18 19:18





 18 19:18


O2 Sat by Pulse Oximetry: 100 (Room air)


Pulse Ox Interpretation: Normal


Progress Note: Blood work and urinalysis ordered. IV fluids administered.  

spoke with dr corea(ob/gyn) re:shortened cervix. will follow up in clinic for 

possible vaginal progesterone


Reevaluation Time: 22:13


Reassessment Condition: Improved





Disposition


Counseled Patient/Family Regarding: Studies Performed, Diagnosis, Need For 

Followup





- Disposition


Referrals: 


[facility] [Outside]


[facility] [Outside]


Disposition: HOME/ ROUTINE


Disposition Time: 22:13


Condition: FAIR


Additional Instructions: 


Please follow up in clinic regarding the shortened cervix


Prescriptions: 


Polyethylene Glycol 3350 [Miralax] 17 gm PO DAILY #270 ml


Instructions:  Bloody Stools, Adult (DC), Pregnancy - The Third Month


Forms:  [employer] (English)





- Clinical Impression


Clinical Impression: 


 Rectal fissure, Pregnancy, Short cervix








- Scribe Statement


The provider has reviewed the documentation as recorded by the Scribe





Jagdeep Acevedo





All medical record entries made by the Scribe were at my direction and 

personally dictated by me. I have reviewed the chart and agree that the record 

accurately reflects my personal performance of the history, physical exam, 

medical decision making, and the department course for this patient. I have 

also personally directed, reviewed, and agree with the discharge instructions 

and disposition.

## 2018-05-29 NOTE — US
EXAM:

  US Pregnancy First Trimester, Transabdominal



CLINICAL HISTORY:

  22 years old, female; Pain; Pregnancy complicated by abdominal or pelvic 

pain; Generalized abdominal pain; First trimester; Gestational age or lmp: 

03/15/2018; Pregnant; Additional info: Abd pain, 10 weeks pregnant



TECHNIQUE:

  Real-time transabdominal obstetrical ultrasound of the maternal pelvis and a 

first trimester pregnancy with image documentation.



COMPARISON:

  PELVIS/TRANSVAG US 2018-04-17 20:55



FINDINGS:

  Gestation:  Single live intrauterine gestation.  Fetal heart rate of 171 

beats per minute.  Crown-rump length of 3.3 cm, correlating with gestational 

age of 10 weeks 1 day.

  Uterus/cervix:  No subchorionic hemorrhage.  Closed cervix.  Cervical length 

= 2.5 cm.  

  Ovaries:  RIGHT ovary: 1.6 x 1.2 x 1.2 cm hypoechoic lesion.  LEFT ovary: 

Normal.  No adnexal masses.

  Free fluid:  No significant free fluid.



IMPRESSION:     

1.  Single live intrauterine gestation.

2.  Closed cervix.  Cervical length = 2.5 cm.

3.  Probable RIGHT ovarian cyst.